# Patient Record
Sex: FEMALE | Race: WHITE | NOT HISPANIC OR LATINO | Employment: OTHER | URBAN - METROPOLITAN AREA
[De-identification: names, ages, dates, MRNs, and addresses within clinical notes are randomized per-mention and may not be internally consistent; named-entity substitution may affect disease eponyms.]

---

## 2020-11-23 ENCOUNTER — OFFICE VISIT (OUTPATIENT)
Dept: ENDOCRINOLOGY | Facility: CLINIC | Age: 75
End: 2020-11-23

## 2020-11-23 VITALS
SYSTOLIC BLOOD PRESSURE: 126 MMHG | OXYGEN SATURATION: 97 % | HEIGHT: 64 IN | HEART RATE: 80 BPM | WEIGHT: 154.4 LBS | BODY MASS INDEX: 26.36 KG/M2 | DIASTOLIC BLOOD PRESSURE: 66 MMHG | TEMPERATURE: 97.1 F

## 2020-11-23 DIAGNOSIS — E11.649 DIABETIC HYPOGLYCEMIA (HCC): ICD-10-CM

## 2020-11-23 DIAGNOSIS — IMO0002 DIABETES MELLITUS TYPE 1, UNCONTROLLED, WITH COMPLICATIONS: Primary | ICD-10-CM

## 2020-11-23 LAB
EXPIRATION DATE: NORMAL
HBA1C MFR BLD: 8.9 %
Lab: NORMAL

## 2020-11-23 PROCEDURE — 99214 OFFICE O/P EST MOD 30 MIN: CPT | Performed by: INTERNAL MEDICINE

## 2020-11-23 PROCEDURE — 95251 CONT GLUC MNTR ANALYSIS I&R: CPT | Performed by: INTERNAL MEDICINE

## 2020-11-23 PROCEDURE — 83036 HEMOGLOBIN GLYCOSYLATED A1C: CPT | Performed by: INTERNAL MEDICINE

## 2020-11-23 RX ORDER — INSULIN GLARGINE 300 U/ML
18 INJECTION, SOLUTION SUBCUTANEOUS DAILY
COMMUNITY
End: 2021-04-09 | Stop reason: SDUPTHER

## 2020-11-23 RX ORDER — OMEPRAZOLE 40 MG/1
CAPSULE, DELAYED RELEASE ORAL
COMMUNITY
Start: 2020-10-22

## 2020-11-23 RX ORDER — INSULIN ASPART 100 [IU]/ML
INJECTION, SOLUTION INTRAVENOUS; SUBCUTANEOUS
COMMUNITY
Start: 2020-10-02 | End: 2021-04-09 | Stop reason: SDUPTHER

## 2020-11-23 NOTE — PROGRESS NOTES
"     Office Note      Date: 2020  Patient Name: Neetu David  MRN: [id number]  : 1945    Chief Complaint   Patient presents with   • Diabetes       History of Present Illness:   Neetu Daivd is a 74 y.o. female who presents for Diabetes - type 1  Duration 3 years  Modifying factors- diet and exercise and meds  Complications- neuropathy.  Context- gets concerned when she see bg readings about 150 because she is worried it will drop . Feels best when the blood sugar is in the 200's..    dexcom data- average 237. 16 % in range. 43 % high . 40 very high. None are low.  Particularly high right after supper.     She has been having pain in the center of her back with exertion and having to rest and it goes away. She will discuss with her pcp as this could be an anginal equivalent         Changes in health since last visit: had melanoma removed. . Last eye exam up to date .    Subjective              Review of Systems:   Review of Systems   Constitutional: Negative.    HENT: Negative.    Eyes: Negative.    Respiratory: Negative.    Cardiovascular: Negative.    Gastrointestinal: Positive for constipation.   Endocrine: Negative.    Genitourinary: Negative.    Musculoskeletal: Positive for back pain.   Allergic/Immunologic: Negative.    Neurological: Negative.    Hematological: Negative.    Psychiatric/Behavioral: Negative.        The following portions of the patient's history were reviewed and updated as appropriate: allergies, current medications, past family history, past medical history, past social history, past surgical history and problem list.    Objective     Visit Vitals  /66 (BP Location: Left arm, Patient Position: Sitting, Cuff Size: Adult)   Pulse 80   Temp 97.1 °F (36.2 °C) (Infrared)   Ht 162.6 cm (64\")   Wt 70 kg (154 lb 6.4 oz)   SpO2 97%   BMI 26.50 kg/m²       Labs:    CMP  No results found for: GLUCOSE, BUN, CREATININE, EGFRIFNONA, EGFRIFAFRI, BCR, K, CO2, CALCIUM, PROTENTOTREF, " LABIL2, BILIRUBIN, AST, ALT     CBC w/DIFF  No results found for: WBC, RBC, HGB, HCT, MCV, MCH, MCHC, RDW, RDWSD, MPV, PLT, NEUTRORELPCT, LYMPHORELPCT, MONORELPCT, EOSRELPCT, BASORELPCT, AUTOIGPER, NEUTROABS, LYMPHSABS, MONOSABS, EOSABS, BASOSABS, AUTOIGNUM, NRBC    Physical Exam:  Physical Exam  Vitals signs reviewed.   Constitutional:       Appearance: Normal appearance. She is obese.   HENT:      Head: Normocephalic and atraumatic.   Neurological:      General: No focal deficit present.      Mental Status: She is alert. Mental status is at baseline.   Psychiatric:         Mood and Affect: Mood normal.         Thought Content: Thought content normal.         Judgment: Judgment normal.          Assessment / Plan      Assessment & Plan:  Problem List Items Addressed This Visit        Endocrine    Diabetes mellitus type 1, uncontrolled, with complications (CMS/HCC) - Primary    Current Assessment & Plan     a1c is worse..   Will have her try to nudge up her insulin with supper but she is very worried about hypoglycemia so she keeps her blood sugar high.           Relevant Medications    NovoLOG FlexPen 100 UNIT/ML solution pen-injector sc pen    Insulin Glargine, 1 Unit Dial, (Toujeo SoloStar) 300 UNIT/ML solution pen-injector injection    Other Relevant Orders    POC Glycosylated Hemoglobin (Hb A1C) (Completed)    Diabetic hypoglycemia (CMS/[facility])    Current Assessment & Plan     Hypoglycemia is the factor limiting our ability to get to goal.            Relevant Medications    NovoLOG FlexPen 100 UNIT/ML solution pen-injector sc pen    Insulin Glargine, 1 Unit Dial, (Toujeo SoloStar) 300 UNIT/ML solution pen-injector injection           Paul Barahona MD   11/23/2020

## 2020-11-23 NOTE — ASSESSMENT & PLAN NOTE
a1c is worse..   Will have her try to nudge up her insulin with supper but she is very worried about hypoglycemia so she keeps her blood sugar high.

## 2020-12-29 ENCOUNTER — TELEPHONE (OUTPATIENT)
Dept: ENDOCRINOLOGY | Facility: CLINIC | Age: 75
End: 2020-12-29

## 2020-12-29 NOTE — TELEPHONE ENCOUNTER
PATIENT NEEDS ORDERS FOR DEXCOM SENSORS TO BE SENT TO [facility] [employer]. SHE STATES [facility] HAS SENT US 2 REQUESTS FOR THESE SENSORS. [facility] [employer] PHONE NUMBER [phone]

## 2021-04-09 ENCOUNTER — OFFICE VISIT (OUTPATIENT)
Dept: ENDOCRINOLOGY | Facility: CLINIC | Age: 76
End: 2021-04-09

## 2021-04-09 VITALS
HEIGHT: 64 IN | DIASTOLIC BLOOD PRESSURE: 50 MMHG | SYSTOLIC BLOOD PRESSURE: 102 MMHG | HEART RATE: 74 BPM | WEIGHT: 146.8 LBS | BODY MASS INDEX: 25.06 KG/M2 | TEMPERATURE: 98 F

## 2021-04-09 DIAGNOSIS — E10.65 TYPE 1 DIABETES MELLITUS WITH HYPERGLYCEMIA (HCC): Primary | Chronic | ICD-10-CM

## 2021-04-09 LAB
EXPIRATION DATE: NORMAL
HBA1C MFR BLD: 8.6 %
Lab: NORMAL

## 2021-04-09 PROCEDURE — 95251 CONT GLUC MNTR ANALYSIS I&R: CPT | Performed by: PHYSICIAN ASSISTANT

## 2021-04-09 PROCEDURE — 83036 HEMOGLOBIN GLYCOSYLATED A1C: CPT | Performed by: PHYSICIAN ASSISTANT

## 2021-04-09 PROCEDURE — 99214 OFFICE O/P EST MOD 30 MIN: CPT | Performed by: PHYSICIAN ASSISTANT

## 2021-04-09 RX ORDER — INSULIN ASPART 100 [IU]/ML
INJECTION, SOLUTION INTRAVENOUS; SUBCUTANEOUS
Qty: 15 ML | Refills: 4 | Status: SHIPPED | OUTPATIENT
Start: 2021-04-09 | End: 2022-04-25

## 2021-04-09 RX ORDER — INSULIN GLARGINE 300 U/ML
18 INJECTION, SOLUTION SUBCUTANEOUS DAILY
Qty: 4.5 ML | Refills: 2 | Status: SHIPPED | OUTPATIENT
Start: 2021-04-09 | End: 2022-03-25

## 2021-04-09 RX ORDER — CHLORHEXIDINE GLUCONATE 0.12 MG/ML
RINSE ORAL DAILY
COMMUNITY
Start: 2021-03-22

## 2021-04-09 RX ORDER — PROCHLORPERAZINE 25 MG/1
SUPPOSITORY RECTAL
COMMUNITY

## 2021-04-09 NOTE — PROGRESS NOTES
"     Office Note      Date: 2021  Patient Name: Neetu David  MRN: [id number]  : 1945    Chief Complaint   Patient presents with   • Diabetes       History of Present Illness:   Neetu David is a 75 y.o. female who presents today for type 1 diabetes.  Diagnosed in 2017 after starting treatment with Opdivo.  She remains on basal/bolus insulin.  Currently taking Toujeo 15 units daily.  She is using the Dexcom G6 CGM.  She is monitoring glucose well over 4 times per day (using CGM).  She is frequently adjusting her insulin based on glucose readings.  She reports that blood sugars continue to be highly variable.  She denies frequent or severe hypoglycemia.  She reports having symptoms of hypoglycemia when glucose reaches the lower 100s.  The pseudohypoglycemia does seem to be improving as she is getting used to lower blood sugars.  She denies any sores on her feet.  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  She notes tingling sensations in toes.  Eye exam up to date.  She reports having teeth pulled about 3 weeks ago.    Subjective      Review of Systems:   Review of Systems   Constitutional: Negative.    Cardiovascular: Negative.    Gastrointestinal: Negative.    Endocrine: Negative.        The following portions of the patient's history were reviewed and updated as appropriate: allergies, current medications, past family history, past medical history, past social history, past surgical history and problem list.    Objective     Vitals:    21 1133   BP: 102/50   Pulse: 74   Temp: 98 °F (36.7 °C)   TempSrc: Infrared   Weight: 66.6 kg (146 lb 12.8 oz)   Height: 162.6 cm (64\")   PainSc: 0-No pain     Body mass index is 25.2 kg/m².    Physical Exam  Vitals reviewed.   Constitutional:       General: She is not in acute distress.  Neurological:      Mental Status: She is alert and oriented to person, place, and time.   Psychiatric:         Mood and Affect: Affect normal.         HEMOGLOBIN " A1C  Lab Results   Component Value Date    HGBA1C 8.6 04/09/2021    HGBA1C 8.9 11/23/2020       Current Outpatient Medications   Medication Instructions   • chlorhexidine (PERIDEX) 0.12 % solution Daily   • Continuous Blood Gluc Sensor (Dexcom G6 Sensor) Does not apply, Every 10 Days   • NovoLOG FlexPen 100 UNIT/ML solution pen-injector sc pen 1 unit per 10g carbs with meals, plus correction 1 unit per 50 over 200; max 32 units per day   • omeprazole (priLOSEC) 40 MG capsule No dose, route, or frequency recorded.   • Toujeo SoloStar 18 Units, Subcutaneous, Daily       Assessment / Plan      Assessment & Plan:  1. Type 1 diabetes mellitus with hyperglycemia (CMS/[facility])  A1c above goal.  Reviewed CGM data and discussed with patient and her daughter.  Sensor glucose average 240 for the past 2 weeks, 26% time in range, <1% low, 43% >250.  She appears to be mainly correcting for hyperglycemia instead of preventing the hyperglycemia.  Dexcom  high glucose alert was set to 390 - we decreased this to 300 (plan to decrease further once better controlled).  Turned on fall rate alert.  Recommend that she start taking insulin with meals based on carb counting.  Will start at 1 unit per 10g carbs.  Discussed adding correction dose to this if hyperglycemic before eating.  Will start 1 unit per 50 over 200, then plan to eventually adjust this down to 150.  Discussed correction doses between meals if needed.  Advised to wait 3-4 hours before giving another correction dose.  Discussed lag time in sensor glucose versus fingersticks when glucose is rising or falling.  Reviewed treatment of hypoglycemia.  Fear of hypoglycemia is hindering her diabetes control.  - POC Glycosylated Hemoglobin (Hb A1C)      Return in about 3 months (around 7/9/2021) for Next scheduled follow up with A1c, urine protein.     SHAHANA Kraft  Endocrinology  04/09/2021

## 2021-07-06 ENCOUNTER — TELEPHONE (OUTPATIENT)
Dept: ENDOCRINOLOGY | Facility: CLINIC | Age: 76
End: 2021-07-06

## 2021-07-09 ENCOUNTER — OFFICE VISIT (OUTPATIENT)
Dept: ENDOCRINOLOGY | Facility: CLINIC | Age: 76
End: 2021-07-09

## 2021-07-09 VITALS — WEIGHT: 153 LBS | BODY MASS INDEX: 26.12 KG/M2 | HEIGHT: 64 IN

## 2021-07-09 DIAGNOSIS — E10.65 TYPE 1 DIABETES MELLITUS WITH HYPERGLYCEMIA (HCC): Primary | Chronic | ICD-10-CM

## 2021-07-09 PROBLEM — E10.649 TYPE 1 DIABETES MELLITUS WITH HYPOGLYCEMIA (HCC): Status: ACTIVE | Noted: 2020-11-23

## 2021-07-09 PROCEDURE — 99213 OFFICE O/P EST LOW 20 MIN: CPT | Performed by: PHYSICIAN ASSISTANT

## 2021-07-09 PROCEDURE — 95251 CONT GLUC MNTR ANALYSIS I&R: CPT | Performed by: PHYSICIAN ASSISTANT

## 2021-07-09 RX ORDER — LORAZEPAM 1 MG/1
1 TABLET ORAL AS NEEDED
COMMUNITY

## 2021-07-09 NOTE — PROGRESS NOTES
"     Office Note      Date: 2021  Patient Name: Neetu David  MRN: [id number]  : 1945    Chief Complaint   Patient presents with   • Diabetes     You have chosen to receive care through a telephone visit. Do you consent to use a telephone visit for your medical care today? Yes    History of Present Illness:   Neetu David is a 75 y.o. female who presents today for type 1 diabetes.  Her daughter accompanies her on the phone today.  She was diagnosed with diabetes in 2017 after starting treatment with Opdivo.  She remains on MDI insulin.  She started taking mealtime insulin based on carb counting after last visit.  Currently taking Toujeo 18 units daily.  She is using the Dexcom G6 CGM.  She is monitoring glucose well over 4 times per day using CGM.  She is frequently adjusting her insulin based on glucose readings.  She notes rare hypoglycemia.  She denies any severe hypoglycemia.  She denies any sores on her feet.  She notes tingling sensations in toes.  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  She notes tingling sensations in toes.  Eye exam up to date.      Subjective      Review of Systems:   Review of Systems   Constitutional: Negative.    Cardiovascular: Negative.    Gastrointestinal: Negative.    Endocrine: Negative.        The following portions of the patient's history were reviewed and updated as appropriate: allergies, current medications, past family history, past medical history, past social history, past surgical history and problem list.    Objective     Vitals:    21 1305   Weight: 69.4 kg (153 lb)   Height: 162.6 cm (64\")   PainSc: 0-No pain     Body mass index is 26.26 kg/m².    Physical Exam    HEMOGLOBIN A1C  Lab Results   Component Value Date    HGBA1C 8.6 2021    HGBA1C 8.9 2020       Current Outpatient Medications   Medication Instructions   • chlorhexidine (PERIDEX) 0.12 % solution Daily   • Continuous Blood Gluc Sensor (Dexcom G6 Sensor) Does " not apply, Every 10 Days   • LORazepam (ATIVAN) 1 mg, Oral, As Needed, Patient states that she cuts the 1mg in half    • NovoLOG FlexPen 100 UNIT/ML solution pen-injector sc pen 1 unit per 10g carbs with meals, plus correction 1 unit per 50 over 200; max 32 units per day   • omeprazole (priLOSEC) 40 MG capsule No dose, route, or frequency recorded.   • Toujeo SoloStar 18 Units, Subcutaneous, Daily       Assessment / Plan      Assessment & Plan:  1. Type 1 diabetes mellitus with hyperglycemia (CMS/[facility])  Reviewed CGM data and discussed with patient.  Sensor glucose average 260 for the past 30 days, 13% time in range, less than 1% low.  Glucose is often dropping throughout the night.  She is hyperglycemic after breakfast and then tends to remain hyperglycemic throughout the day.  Fear of hypoglycemia is hindering control.  Decrease Toujeo to 14 units daily.  Adjust carb ratio with breakfast to 1:8, continue 1:10 with lunch and dinner.  Continue current correction factor to 1 unit per 50, but decrease correction threshold to 150 instead of 200.  Set up Dexcom sharing.  She will contact the office in 2 weeks and I will review CGM again.    25 minutes spent on phone with patient.    Return in about 2 months (around 9/9/2021) for Next scheduled follow up.     SHAHANA Kraft  Endocrinology  07/09/2021

## 2022-01-18 RX ORDER — PEN NEEDLE, DIABETIC 30 GX3/16"
1 NEEDLE, DISPOSABLE MISCELLANEOUS 4 TIMES DAILY
Qty: 400 EACH | Refills: 0 | Status: SHIPPED | OUTPATIENT
Start: 2022-01-18

## 2022-03-25 RX ORDER — INSULIN GLARGINE 300 U/ML
18 INJECTION, SOLUTION SUBCUTANEOUS DAILY
Qty: 4.5 ML | Refills: 0 | Status: SHIPPED | OUTPATIENT
Start: 2022-03-25 | End: 2022-06-07

## 2022-04-25 RX ORDER — INSULIN ASPART 100 [IU]/ML
INJECTION, SOLUTION INTRAVENOUS; SUBCUTANEOUS
Qty: 15 ML | Refills: 0 | Status: SHIPPED | OUTPATIENT
Start: 2022-04-25 | End: 2022-06-07

## 2022-05-23 ENCOUNTER — OFFICE VISIT (OUTPATIENT)
Dept: ENDOCRINOLOGY | Facility: CLINIC | Age: 77
End: 2022-05-23

## 2022-05-23 VITALS — BODY MASS INDEX: 22.71 KG/M2 | HEIGHT: 64 IN | WEIGHT: 133 LBS

## 2022-05-23 DIAGNOSIS — E10.65 TYPE 1 DIABETES MELLITUS WITH HYPERGLYCEMIA: Primary | Chronic | ICD-10-CM

## 2022-05-23 PROCEDURE — 99213 OFFICE O/P EST LOW 20 MIN: CPT | Performed by: PHYSICIAN ASSISTANT

## 2022-06-07 RX ORDER — INSULIN GLARGINE 300 U/ML
INJECTION, SOLUTION SUBCUTANEOUS
Start: 2022-06-07 | End: 2022-06-20

## 2022-06-07 RX ORDER — INSULIN ASPART 100 [IU]/ML
INJECTION, SOLUTION INTRAVENOUS; SUBCUTANEOUS
Start: 2022-06-07 | End: 2022-08-01

## 2022-06-20 DIAGNOSIS — E10.65 TYPE 1 DIABETES MELLITUS WITH HYPERGLYCEMIA: Chronic | ICD-10-CM

## 2022-06-20 RX ORDER — INSULIN GLARGINE 300 U/ML
INJECTION, SOLUTION SUBCUTANEOUS
Qty: 4.5 ML | Refills: 2 | Status: SHIPPED | OUTPATIENT
Start: 2022-06-20 | End: 2022-09-09

## 2022-08-01 DIAGNOSIS — E10.65 TYPE 1 DIABETES MELLITUS WITH HYPERGLYCEMIA: Chronic | ICD-10-CM

## 2022-08-01 RX ORDER — INSULIN ASPART 100 [IU]/ML
INJECTION, SOLUTION INTRAVENOUS; SUBCUTANEOUS
Qty: 15 ML | Refills: 0 | Status: SHIPPED | OUTPATIENT
Start: 2022-08-01 | End: 2022-09-09 | Stop reason: SDUPTHER

## 2022-09-09 ENCOUNTER — OFFICE VISIT (OUTPATIENT)
Dept: ENDOCRINOLOGY | Facility: CLINIC | Age: 77
End: 2022-09-09

## 2022-09-09 ENCOUNTER — LAB (OUTPATIENT)
Dept: LAB | Facility: HOSPITAL | Age: 77
End: 2022-09-09

## 2022-09-09 VITALS
DIASTOLIC BLOOD PRESSURE: 58 MMHG | HEART RATE: 74 BPM | SYSTOLIC BLOOD PRESSURE: 114 MMHG | HEIGHT: 64 IN | BODY MASS INDEX: 22.57 KG/M2 | WEIGHT: 132.2 LBS | OXYGEN SATURATION: 97 %

## 2022-09-09 DIAGNOSIS — E10.65 TYPE 1 DIABETES MELLITUS WITH HYPERGLYCEMIA: ICD-10-CM

## 2022-09-09 DIAGNOSIS — E10.65 TYPE 1 DIABETES MELLITUS WITH HYPERGLYCEMIA: Primary | ICD-10-CM

## 2022-09-09 LAB
EXPIRATION DATE: ABNORMAL
EXPIRATION DATE: NORMAL
GLUCOSE BLDC GLUCOMTR-MCNC: 450 MG/DL (ref 70–130)
HBA1C MFR BLD: 11.1 %
Lab: ABNORMAL
Lab: NORMAL

## 2022-09-09 PROCEDURE — 83036 HEMOGLOBIN GLYCOSYLATED A1C: CPT | Performed by: INTERNAL MEDICINE

## 2022-09-09 PROCEDURE — 95251 CONT GLUC MNTR ANALYSIS I&R: CPT | Performed by: INTERNAL MEDICINE

## 2022-09-09 PROCEDURE — 99214 OFFICE O/P EST MOD 30 MIN: CPT | Performed by: INTERNAL MEDICINE

## 2022-09-09 PROCEDURE — 3046F HEMOGLOBIN A1C LEVEL >9.0%: CPT | Performed by: INTERNAL MEDICINE

## 2022-09-09 RX ORDER — INSULIN ASPART 100 [IU]/ML
INJECTION, SOLUTION INTRAVENOUS; SUBCUTANEOUS
Qty: 15 ML | Refills: 3 | Status: SHIPPED | OUTPATIENT
Start: 2022-09-09

## 2022-09-09 RX ORDER — INSULIN GLARGINE 300 U/ML
INJECTION, SOLUTION SUBCUTANEOUS
Qty: 9 ML | Refills: 3 | Status: SHIPPED | OUTPATIENT
Start: 2022-09-09

## 2022-09-09 RX ORDER — INSULIN GLARGINE 300 U/ML
INJECTION, SOLUTION SUBCUTANEOUS
Qty: 4.5 ML | Refills: 2 | Status: SHIPPED | OUTPATIENT
Start: 2022-09-09 | End: 2022-09-09 | Stop reason: SDUPTHER

## 2022-09-09 NOTE — ASSESSMENT & PLAN NOTE
Deteriorated .   Clearly not on enough insulin.   I gave her a protocol to target a fasting bg of 160- below that she feels weaker

## 2022-09-09 NOTE — PROGRESS NOTES
"     Office Note      Date: 2022  Patient Name: Neetu David  MRN: [id number]  : 1945    Chief Complaint   Patient presents with   • Diabetes       History of Present Illness:   Neetu David is a 76 y.o. female who presents for Diabetes - type 1 due to opdivoa  Bg is check 288 times per day with dexcom.  Insulin doses are adjusted frequently based upon the readings.  Patient has occasional hypoglycemia.  Patient has been using the system during the last 3 months.  She takes 4 insulin shots per day    2 weeks of dexcom data show average of >270  With 86 % over 250      Last A1c:  Hemoglobin A1C   Date Value Ref Range Status   2022 11.1 % Final       Changes in health since last visit: none. Last eye exam pending in November  Her lipids are checked elsewhere but she is due for foot check and jose .    Subjective          Review of Systems:   Review of Systems   Eyes: Positive for visual disturbance.   Endocrine: Positive for polydipsia and polyuria.       The following portions of the patient's history were reviewed and updated as appropriate: allergies, current medications, past family history, past medical history, past social history, past surgical history and problem list.    Objective     Visit Vitals  /58   Pulse 74   Ht 162.6 cm (64\")   Wt 60 kg (132 lb 3.2 oz)   SpO2 97%   BMI 22.69 kg/m²       Labs:    CMP  No results found for: GLUCOSE, BUN, CREATININE, EGFRIFNONA, EGFRIFAFRI, BCR, K, CO2, CALCIUM, PROTENTOTREF, LABIL2, BILIRUBIN, AST, ALT     CBC w/DIFF  No results found for: WBC, RBC, HGB, HCT, MCV, MCH, MCHC, RDW, RDWSD, MPV, PLT, NEUTRORELPCT, LYMPHORELPCT, MONORELPCT, EOSRELPCT, BASORELPCT, AUTOIGPER, NEUTROABS, LYMPHSABS, MONOSABS, EOSABS, BASOSABS, AUTOIGNUM, NRBC    Physical Exam:  Physical Exam  Vitals reviewed.   Constitutional:       Appearance: Normal appearance. She is normal weight.   HENT:      Head: Normocephalic and atraumatic.   Cardiovascular:      Pulses:         "   Dorsalis pedis pulses are 2+ on the right side and 2+ on the left side.        Posterior tibial pulses are 2+ on the right side and 2+ on the left side.   Musculoskeletal:      Right foot: Normal range of motion. No deformity, bunion, Charcot foot, foot drop or prominent metatarsal heads.      Left foot: Normal range of motion. No deformity, bunion, Charcot foot, foot drop or prominent metatarsal heads.   Feet:      Right foot:      Protective Sensation: 10 sites tested. 6 sites sensed.      Skin integrity: Skin integrity normal.      Toenail Condition: Right toenails are normal.      Left foot:      Protective Sensation: 10 sites tested. 6 sites sensed.      Skin integrity: Skin integrity normal.      Toenail Condition: Left toenails are normal.      Comments: Diabetic Foot Exam Performed and Monofilament Test Performed    Neurological:      Mental Status: She is alert.   Psychiatric:         Mood and Affect: Mood normal.         Thought Content: Thought content normal.         Judgment: Judgment normal.          Assessment / Plan      Assessment & Plan:  Problem List Items Addressed This Visit        Other    Type 1 diabetes mellitus with hyperglycemia (HCC) - Primary (Chronic)    Overview     Diagnosed 2017, after starting treatment with Opdivo         Current Assessment & Plan     Deteriorated .   Clearly not on enough insulin.   I gave her a protocol to target a fasting bg of 160- below that she feels weaker          Relevant Medications    Toujeo SoloStar 300 UNIT/ML solution pen-injector injection    NovoLOG FlexPen 100 UNIT/ML solution pen-injector sc pen    Other Relevant Orders    POC Glucose, Blood (Completed)    POC Glycosylated Hemoglobin (Hb A1C) (Completed)    Microalbumin / Creatinine Urine Ratio - Urine, Clean Catch           Paul Barahona MD   09/09/2022

## 2022-09-10 LAB
ALBUMIN/CREAT UR: 32 MG/G CREAT (ref 0–29)
CREAT UR-MCNC: 48.2 MG/DL
MICROALBUMIN UR-MCNC: 15.4 UG/ML